# Patient Record
Sex: FEMALE
[De-identification: names, ages, dates, MRNs, and addresses within clinical notes are randomized per-mention and may not be internally consistent; named-entity substitution may affect disease eponyms.]

---

## 2018-06-25 ENCOUNTER — LAB SERVICES (OUTPATIENT)
Dept: OTHER | Age: 28
End: 2018-06-25

## 2018-06-26 LAB — TXT: NORMAL

## 2018-10-06 ENCOUNTER — HOSPITAL ENCOUNTER (EMERGENCY)
Facility: HOSPITAL | Age: 28
Discharge: HOME | End: 2018-10-06
Attending: EMERGENCY MEDICINE
Payer: COMMERCIAL

## 2018-10-06 VITALS
SYSTOLIC BLOOD PRESSURE: 133 MMHG | BODY MASS INDEX: 26.24 KG/M2 | RESPIRATION RATE: 17 BRPM | OXYGEN SATURATION: 98 % | HEART RATE: 53 BPM | WEIGHT: 125 LBS | DIASTOLIC BLOOD PRESSURE: 81 MMHG | HEIGHT: 58 IN | TEMPERATURE: 98 F

## 2018-10-06 VITALS
SYSTOLIC BLOOD PRESSURE: 130 MMHG | WEIGHT: 125 LBS | BODY MASS INDEX: 26.24 KG/M2 | RESPIRATION RATE: 16 BRPM | HEART RATE: 56 BPM | OXYGEN SATURATION: 98 % | TEMPERATURE: 99 F | DIASTOLIC BLOOD PRESSURE: 71 MMHG | HEIGHT: 58 IN

## 2018-10-06 DIAGNOSIS — R10.9 ABDOMINAL PAIN OF UNKNOWN ETIOLOGY: Primary | ICD-10-CM

## 2018-10-06 PROCEDURE — 99284 EMERGENCY DEPT VISIT MOD MDM: CPT

## 2018-10-06 PROCEDURE — 81003 URINALYSIS AUTO W/O SCOPE: CPT | Performed by: EMERGENCY MEDICINE

## 2018-10-06 PROCEDURE — 80053 COMPREHEN METABOLIC PANEL: CPT | Performed by: EMERGENCY MEDICINE

## 2018-10-06 PROCEDURE — 99282 EMERGENCY DEPT VISIT SF MDM: CPT

## 2018-10-06 PROCEDURE — 81025 URINE PREGNANCY TEST: CPT

## 2018-10-06 PROCEDURE — 36415 COLL VENOUS BLD VENIPUNCTURE: CPT

## 2018-10-06 PROCEDURE — 85025 COMPLETE CBC W/AUTO DIFF WBC: CPT | Performed by: EMERGENCY MEDICINE

## 2018-10-06 NOTE — ED NOTES
Pt aox4. Rn discussed dc and follow up with outpatient pcp with patient. Pt verbalized understanding of dc instructions. Pt ambulated to ed exit carrying baby carrier.

## 2018-10-06 NOTE — ED INITIAL ASSESSMENT (HPI)
Pt was discharged from ER this AM for abd pain. Pt came back to ER requesting a CT or US of abd. Pt sts I was told I would get a picture of my abd.

## 2018-10-06 NOTE — ED NOTES
Enter pt room to start IV for CT with contrast. Pt sts \"Will I be able to see my abd?\" Asked pt to clarify question. Pt sts \"I want to see inside my abd. \" Asked pt if she is wondering if she is pregnant. Pt sts \"yes. \" Informed pt preg test was comple

## 2018-10-06 NOTE — ED PROVIDER NOTES
Patient Seen in: [facility] Emergency Department    History   Patient presents with:  Abdomen/Flank Pain (GI/)    Stated Complaint: ABD PAIN, HEADACHE    HPI    24-year-old  female presents to the ER complaining of having abdominal pain for the pa posterior pharyngeal lesions. Tympanic members are intact and clear bilaterally. No JVD or adenopathy. Lungs: Clear to auscultation bilaterally. No wheezes, rhonchi, or rales appreciated. No accessory muscle use noted for breathing.   Cardiac: Regular follow-up as an outpatient with her primary care physician. At this time she has a benign abdomen and she will need follow-up. Abdominal pain is due to an unknown etiology. Patient tells me that she actually had her  3 months ago.   She does not

## 2018-10-06 NOTE — ED INITIAL ASSESSMENT (HPI)
Pt to ED c/o intermittent abdominal pain, headache and nausea x 1 wk. Last BM yesterday. Pt had a  on 2018 and is occassionally breastfeeding.

## 2018-10-06 NOTE — ED PROVIDER NOTES
Patient Seen in: [facility] Emergency Department    History   Patient presents with:  Abdomen/Flank Pain (GI/)    Stated Complaint:     HPI    53-year-old female discharged from this emergency room earlier this morning returns requesting further w left side of the abdomen just lateral to the umbilicus. Normal bowel sounds throughout. No abdominal masses.   No peritoneal signs   Extremities: no edema, normal peripheral pulses   Neuro: Alert oriented and nonfocal   Skin: no rashes or nodules    ED Co

## 2018-10-06 NOTE — ED NOTES
Enter pt room to updated pt that only a CT is being ordered. Pt sts \"I'm leaving because I dont trust you guys. \"

## 2018-11-23 ENCOUNTER — IMAGING SERVICES (OUTPATIENT)
Dept: OTHER | Age: 28
End: 2018-11-23

## 2020-07-07 ENCOUNTER — HOSPITAL ENCOUNTER (EMERGENCY)
Facility: HOSPITAL | Age: 30
Discharge: LEFT BEFORE DISCHARGE | End: 2020-07-07
Payer: COMMERCIAL

## 2020-07-07 VITALS
SYSTOLIC BLOOD PRESSURE: 108 MMHG | RESPIRATION RATE: 14 BRPM | DIASTOLIC BLOOD PRESSURE: 68 MMHG | OXYGEN SATURATION: 99 % | WEIGHT: 110 LBS | HEIGHT: 57 IN | HEART RATE: 66 BPM | BODY MASS INDEX: 23.73 KG/M2 | TEMPERATURE: 99 F

## 2020-07-07 NOTE — ED INITIAL ASSESSMENT (HPI)
Pt states wants to get tested for an STD \"just to be sure\", states had unprotected sex and wants to be checked.

## (undated) NOTE — ED AVS SNAPSHOT
Kedar Castro   MRN: [id number]    Department:  [facility] Emergency Department   Date of Visit:  10/6/2018           Disclosure     Insurance plans vary and the physician(s) referred by the ER may not be covered by your plan.  Please contact tell this physician (or your personal doctor if your instructions are to return to your personal doctor) about any new or lasting problems. The primary care or specialist physician will see patients referred from the [facility] Emergency Department.  Lucien Jones